# Patient Record
Sex: FEMALE | Race: WHITE | NOT HISPANIC OR LATINO
[De-identification: names, ages, dates, MRNs, and addresses within clinical notes are randomized per-mention and may not be internally consistent; named-entity substitution may affect disease eponyms.]

---

## 2017-08-09 ENCOUNTER — OTHER (OUTPATIENT)
Age: 22
End: 2017-08-09

## 2017-08-17 ENCOUNTER — APPOINTMENT (OUTPATIENT)
Dept: OBGYN | Facility: CLINIC | Age: 22
End: 2017-08-17
Payer: COMMERCIAL

## 2017-08-17 VITALS
SYSTOLIC BLOOD PRESSURE: 120 MMHG | BODY MASS INDEX: 23.54 KG/M2 | DIASTOLIC BLOOD PRESSURE: 76 MMHG | WEIGHT: 150 LBS | HEIGHT: 67 IN

## 2017-08-17 PROCEDURE — 99395 PREV VISIT EST AGE 18-39: CPT

## 2017-08-18 LAB
SOURCE AMPLIFICATION: NORMAL
T VAGINALIS RRNA SPEC QL NAA+PROBE: NORMAL

## 2017-08-21 LAB
C TRACH RRNA SPEC QL NAA+PROBE: NORMAL
N GONORRHOEA RRNA SPEC QL NAA+PROBE: NORMAL
SOURCE AMPLIFICATION: NORMAL

## 2017-08-23 LAB — CYTOLOGY CVX/VAG DOC THIN PREP: NORMAL

## 2018-02-26 ENCOUNTER — RX RENEWAL (OUTPATIENT)
Age: 23
End: 2018-02-26

## 2018-08-29 ENCOUNTER — APPOINTMENT (OUTPATIENT)
Dept: OBGYN | Facility: CLINIC | Age: 23
End: 2018-08-29
Payer: COMMERCIAL

## 2018-08-29 ENCOUNTER — LABORATORY RESULT (OUTPATIENT)
Age: 23
End: 2018-08-29

## 2018-08-29 VITALS — WEIGHT: 150 LBS | DIASTOLIC BLOOD PRESSURE: 74 MMHG | SYSTOLIC BLOOD PRESSURE: 109 MMHG

## 2018-08-29 DIAGNOSIS — N94.6 DYSMENORRHEA, UNSPECIFIED: ICD-10-CM

## 2018-08-29 PROCEDURE — 99213 OFFICE O/P EST LOW 20 MIN: CPT

## 2018-08-29 PROCEDURE — 36415 COLL VENOUS BLD VENIPUNCTURE: CPT

## 2018-08-29 PROCEDURE — 99395 PREV VISIT EST AGE 18-39: CPT | Mod: 25

## 2018-08-30 LAB
PROLACTIN SERPL-MCNC: 19.3 NG/ML
TSH SERPL-ACNC: 2.03 UIU/ML

## 2018-09-06 ENCOUNTER — OTHER (OUTPATIENT)
Age: 23
End: 2018-09-06

## 2018-09-06 LAB — CYTOLOGY CVX/VAG DOC THIN PREP: NORMAL

## 2018-09-18 ENCOUNTER — OTHER (OUTPATIENT)
Age: 23
End: 2018-09-18

## 2018-09-24 ENCOUNTER — ASOB RESULT (OUTPATIENT)
Age: 23
End: 2018-09-24

## 2018-09-24 ENCOUNTER — APPOINTMENT (OUTPATIENT)
Dept: OBGYN | Facility: CLINIC | Age: 23
End: 2018-09-24
Payer: COMMERCIAL

## 2018-09-24 PROCEDURE — 76830 TRANSVAGINAL US NON-OB: CPT

## 2019-01-22 ENCOUNTER — MEDICATION RENEWAL (OUTPATIENT)
Age: 24
End: 2019-01-22

## 2019-01-22 ENCOUNTER — RX RENEWAL (OUTPATIENT)
Age: 24
End: 2019-01-22

## 2019-07-22 ENCOUNTER — RX RENEWAL (OUTPATIENT)
Age: 24
End: 2019-07-22

## 2019-09-03 ENCOUNTER — OTHER (OUTPATIENT)
Age: 24
End: 2019-09-03

## 2019-09-03 ENCOUNTER — MEDICATION RENEWAL (OUTPATIENT)
Age: 24
End: 2019-09-03

## 2019-09-05 ENCOUNTER — APPOINTMENT (OUTPATIENT)
Dept: OBGYN | Facility: CLINIC | Age: 24
End: 2019-09-05

## 2019-09-23 ENCOUNTER — APPOINTMENT (OUTPATIENT)
Dept: OBGYN | Facility: CLINIC | Age: 24
End: 2019-09-23

## 2019-09-30 ENCOUNTER — RX RENEWAL (OUTPATIENT)
Age: 24
End: 2019-09-30

## 2019-10-24 ENCOUNTER — APPOINTMENT (OUTPATIENT)
Dept: OBGYN | Facility: CLINIC | Age: 24
End: 2019-10-24
Payer: COMMERCIAL

## 2019-10-24 VITALS
WEIGHT: 151 LBS | SYSTOLIC BLOOD PRESSURE: 118 MMHG | HEIGHT: 67 IN | BODY MASS INDEX: 23.7 KG/M2 | DIASTOLIC BLOOD PRESSURE: 81 MMHG

## 2019-10-24 DIAGNOSIS — Z11.3 ENCOUNTER FOR SCREENING FOR INFECTIONS WITH A PREDOMINANTLY SEXUAL MODE OF TRANSMISSION: ICD-10-CM

## 2019-10-24 PROCEDURE — 99395 PREV VISIT EST AGE 18-39: CPT | Mod: 25

## 2019-10-24 PROCEDURE — 36415 COLL VENOUS BLD VENIPUNCTURE: CPT

## 2019-10-24 RX ORDER — NORETHINDRONE ACETATE AND ETHINYL ESTRADIOL AND FERROUS FUMARATE 1.5-30(21)
1.5-3 KIT ORAL
Qty: 3 | Refills: 3 | Status: COMPLETED | COMMUNITY
Start: 2018-08-29 | End: 2019-10-24

## 2019-10-24 NOTE — PHYSICAL EXAM
[Awake] : awake [Alert] : alert [Acute Distress] : no acute distress [Mass] : no breast mass [Axillary LAD] : no axillary lymphadenopathy [Nipple Discharge] : no nipple discharge [Soft] : soft [Tender] : non tender [Oriented x3] : oriented to person, place, and time [Normal] : cervix [No Bleeding] : there was no active vaginal bleeding [Uterine Adnexae] : were not tender and not enlarged

## 2019-10-24 NOTE — HISTORY OF PRESENT ILLNESS
[1 Year Ago] : 1 year ago [Good] : being in good health [Reproductive Age] : is of reproductive age [Last Pap ___] : Last cervical pap smear was [unfilled] [Sexually Active] : is sexually active [Monogamous] : is monogamous [Contraception] : uses contraception [Condoms] : uses condoms [Oral Contraceptives] : uses oral contraceptives [Male ___] : [unfilled] male [No] : no

## 2019-10-25 LAB
HBV SURFACE AG SER QL: NONREACTIVE
HCV AB SER QL: NONREACTIVE
HCV S/CO RATIO: 0.18 S/CO
HIV1+2 AB SPEC QL IA.RAPID: NONREACTIVE
T PALLIDUM AB SER QL IA: NEGATIVE

## 2019-10-28 ENCOUNTER — RESULT REVIEW (OUTPATIENT)
Age: 24
End: 2019-10-28

## 2019-10-28 LAB
C TRACH RRNA SPEC QL NAA+PROBE: NOT DETECTED
N GONORRHOEA RRNA SPEC QL NAA+PROBE: NOT DETECTED
SOURCE AMPLIFICATION: NORMAL
SOURCE AMPLIFICATION: NORMAL
T VAGINALIS RRNA SPEC QL NAA+PROBE: NOT DETECTED

## 2019-10-31 LAB — CYTOLOGY CVX/VAG DOC THIN PREP: ABNORMAL

## 2019-12-31 ENCOUNTER — MEDICATION RENEWAL (OUTPATIENT)
Age: 24
End: 2019-12-31

## 2020-11-13 ENCOUNTER — RX RENEWAL (OUTPATIENT)
Age: 25
End: 2020-11-13

## 2021-01-25 ENCOUNTER — APPOINTMENT (OUTPATIENT)
Dept: OBGYN | Facility: CLINIC | Age: 26
End: 2021-01-25
Payer: COMMERCIAL

## 2021-01-25 VITALS
HEIGHT: 67 IN | SYSTOLIC BLOOD PRESSURE: 132 MMHG | DIASTOLIC BLOOD PRESSURE: 87 MMHG | WEIGHT: 160 LBS | BODY MASS INDEX: 25.11 KG/M2

## 2021-01-25 DIAGNOSIS — Z01.419 ENCOUNTER FOR GYNECOLOGICAL EXAMINATION (GENERAL) (ROUTINE) W/OUT ABNORMAL FINDINGS: ICD-10-CM

## 2021-01-25 DIAGNOSIS — G43.109 MIGRAINE WITH AURA, NOT INTRACTABLE, W/OUT STATUS MIGRAINOSUS: ICD-10-CM

## 2021-01-25 PROCEDURE — 99072 ADDL SUPL MATRL&STAF TM PHE: CPT

## 2021-01-25 PROCEDURE — 99395 PREV VISIT EST AGE 18-39: CPT

## 2021-01-25 RX ORDER — NORETHINDRONE ACETATE AND ETHINYL ESTRADIOL 1.5-30(21)
1.5-3 KIT ORAL
Qty: 1 | Refills: 0 | Status: COMPLETED | COMMUNITY
Start: 2019-07-22 | End: 2021-01-25

## 2021-01-25 NOTE — HISTORY OF PRESENT ILLNESS
[TextBox_4] : Pt c/o cramps on OCP.  Gets bad migraines and worse with menses - does have visual disturbances.   [PapSmeardate] : 2019

## 2021-01-25 NOTE — DISCUSSION/SUMMARY
[FreeTextEntry1] : discussed pt should not be on OCP if she has migraine with aura\par pt to return for kyleena

## 2021-02-01 LAB — CYTOLOGY CVX/VAG DOC THIN PREP: NORMAL

## 2021-02-24 ENCOUNTER — EMERGENCY (EMERGENCY)
Facility: HOSPITAL | Age: 26
LOS: 1 days | Discharge: HOME | End: 2021-02-24
Attending: EMERGENCY MEDICINE
Payer: COMMERCIAL

## 2021-02-24 VITALS
OXYGEN SATURATION: 99 % | SYSTOLIC BLOOD PRESSURE: 120 MMHG | RESPIRATION RATE: 16 BRPM | TEMPERATURE: 98 F | DIASTOLIC BLOOD PRESSURE: 78 MMHG | HEART RATE: 88 BPM

## 2021-02-24 VITALS
RESPIRATION RATE: 18 BRPM | TEMPERATURE: 98 F | DIASTOLIC BLOOD PRESSURE: 74 MMHG | WEIGHT: 154.98 LBS | SYSTOLIC BLOOD PRESSURE: 115 MMHG | HEART RATE: 95 BPM | OXYGEN SATURATION: 97 % | HEIGHT: 68 IN

## 2021-02-24 PROCEDURE — 90471 IMMUNIZATION ADMIN: CPT

## 2021-02-24 PROCEDURE — 99283 EMERGENCY DEPT VISIT LOW MDM: CPT | Mod: 25

## 2021-02-24 PROCEDURE — 90715 TDAP VACCINE 7 YRS/> IM: CPT

## 2021-02-24 PROCEDURE — 73130 X-RAY EXAM OF HAND: CPT

## 2021-02-24 PROCEDURE — 99283 EMERGENCY DEPT VISIT LOW MDM: CPT

## 2021-02-24 PROCEDURE — 73130 X-RAY EXAM OF HAND: CPT | Mod: 26,RT

## 2021-02-24 RX ORDER — TETANUS TOXOID, REDUCED DIPHTHERIA TOXOID AND ACELLULAR PERTUSSIS VACCINE, ADSORBED 5; 2.5; 8; 8; 2.5 [IU]/.5ML; [IU]/.5ML; UG/.5ML; UG/.5ML; UG/.5ML
0.5 SUSPENSION INTRAMUSCULAR ONCE
Refills: 0 | Status: COMPLETED | OUTPATIENT
Start: 2021-02-24 | End: 2021-02-24

## 2021-02-24 RX ADMIN — TETANUS TOXOID, REDUCED DIPHTHERIA TOXOID AND ACELLULAR PERTUSSIS VACCINE, ADSORBED 0.5 MILLILITER(S): 5; 2.5; 8; 8; 2.5 SUSPENSION INTRAMUSCULAR at 11:18

## 2021-02-24 NOTE — ED PROVIDER NOTE - PATIENT PORTAL LINK FT
You can access the FollowMyHealth Patient Portal offered by [facility] by registering at the following website: [email or web address]. By joining [employer]’s FollowMyHealth portal, you will also be able to view your health information using other applications (apps) compatible with our system.

## 2021-02-24 NOTE — ED PROVIDER NOTE - PROGRESS NOTE DETAILS
XR shows no acute fx. Abrasion and skin avulsion irrigated with NS and cleaned with chlorhexidine. Bacitracin applied and covered with a bandaid. all questions answered. pt ready and stable for DC. -Erlin Mendes PA-C
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
## 2021-02-24 NOTE — ED PROVIDER NOTE - NSFOLLOWUPINSTRUCTIONS_ED_ALL_ED_FT
Follow up with your primary medical doctor in 1-2 day.      Keep the affected areas clean, cover with a bandaid.      Return to the ER for any persistent/worsening symptoms or if you experience any new symptoms such as fever, redness, numbness , you notice a new rash, increased irritation, abnormal discharge, or any concerning symptoms.

## 2021-02-24 NOTE — ED ADULT NURSE NOTE - NSIMPLEMENTINTERV_GEN_ALL_ED
Implemented All Universal Safety Interventions:  [place] to call system. Call bell, personal items and telephone within reach. Instruct patient to call for assistance. Room bathroom lighting operational. Non-slip footwear when patient is off stretcher. Physically safe environment: no spills, clutter or unnecessary equipment. Stretcher in lowest position, wheels locked, appropriate side rails in place.
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
## 2021-02-24 NOTE — ED PROVIDER NOTE - PHYSICAL EXAMINATION
GEN: Pt in NAD, A&O x3.  PSYCH: Affect appropriate.  EYES: Sclera white w/o injection.   ENT: Head NCAT. MMM. Neck supple FROM.  RESP: CTA b/l, no wheezes, rales, or rhonchi.   CARDIAC: RRR, clear distinct S1, S2, no appreciable murmurs.  ABD: Abdomen soft, non-tender. No CVAT b/l.  VASC: 2+ radial and dorsalis pedis pulses b/l. No edema or calf tenderness.  MSK: No joint erythema or obvious deformity. No obvious spinal deformity, no midline spinal ttp, no step-offs. FROM b/l UE and LE w/o pain. +mild tenderness over R thenar eminence with faint overlying ecchymosis.   SKIN: Superficial abrasion and mild ecchymosis of R knee. 2-3mm skin avulsion on dorsal aspect of R thumb, superficial abrasion ~5mm dorsal aspect of R wrist.
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
## 2021-02-24 NOTE — ED PROVIDER NOTE - CARE PLAN
Principal Discharge DX:	Thumb pain, right  Secondary Diagnosis:	Abrasion of right knee, initial encounter

## 2021-02-26 ENCOUNTER — APPOINTMENT (OUTPATIENT)
Dept: OBGYN | Facility: CLINIC | Age: 26
End: 2021-02-26
Payer: COMMERCIAL

## 2021-02-26 ENCOUNTER — ASOB RESULT (OUTPATIENT)
Age: 26
End: 2021-02-26

## 2021-02-26 DIAGNOSIS — Z30.430 ENCOUNTER FOR INSERTION OF INTRAUTERINE CONTRACEPTIVE DEVICE: ICD-10-CM

## 2021-02-26 LAB — HCG UR QL: NEGATIVE

## 2021-02-26 PROCEDURE — 99072 ADDL SUPL MATRL&STAF TM PHE: CPT

## 2021-02-26 PROCEDURE — 81025 URINE PREGNANCY TEST: CPT

## 2021-02-26 PROCEDURE — 76817 TRANSVAGINAL US OBSTETRIC: CPT

## 2021-02-26 PROCEDURE — 58300 INSERT INTRAUTERINE DEVICE: CPT

## 2021-02-26 NOTE — PROCEDURE
[IUD Placement] : intrauterine device (IUD) placement [Prevention of Pregnancy] : prevention of pregnancy [Neg Pregnancy Test] : negative pregnancy test [Neg GC/Chlamydia] : negative GC/Chlamydia [Ibuprofen ___ mg] : ibuprofen [unfilled] ~Umg [Ring Forceps] : Ring forceps [Easy Passage] : Easy passage [Sounded to ___ cm] : sounded to [unfilled] ~Ucm [Kyleena IUD] : Kyleena IUD [Tolerated Well] : Patient tolerated the procedure well [No Complications] : No complications [None] : None [de-identified] : [id number] [de-identified] : 10/22

## 2023-12-18 PROBLEM — Z78.9 OTHER SPECIFIED HEALTH STATUS: Chronic | Status: ACTIVE | Noted: 2021-02-24

## 2024-01-04 ENCOUNTER — APPOINTMENT (OUTPATIENT)
Dept: OBGYN | Facility: CLINIC | Age: 29
End: 2024-01-04